# Patient Record
Sex: MALE | Race: BLACK OR AFRICAN AMERICAN | NOT HISPANIC OR LATINO | Employment: OTHER | URBAN - METROPOLITAN AREA
[De-identification: names, ages, dates, MRNs, and addresses within clinical notes are randomized per-mention and may not be internally consistent; named-entity substitution may affect disease eponyms.]

---

## 2017-01-04 ENCOUNTER — CLINICAL SUPPORT (OUTPATIENT)
Dept: REHABILITATION | Facility: HOSPITAL | Age: 42
End: 2017-01-04
Attending: INTERNAL MEDICINE
Payer: MEDICARE

## 2017-01-04 DIAGNOSIS — M51.9 LUMBAR DISC DISEASE: ICD-10-CM

## 2017-01-04 PROCEDURE — 97010 HOT OR COLD PACKS THERAPY: CPT | Mod: PN

## 2017-01-04 PROCEDURE — 97110 THERAPEUTIC EXERCISES: CPT | Mod: PN

## 2017-01-04 NOTE — PROGRESS NOTES
"Name: Francisco J CAMPUZANO [facility] Number: [id number]  Date of Treatment: 01/04/2017   Diagnosis:   Encounter Diagnosis   Name Primary?    Lumbar disc disease        Time in: 1203  Time Out: 1210  Total Treatment Time: 67        Subjective:    "I got a little shock in me.  I think it's arthritis.  It goes to aggravating me."    Francisco J reports feeling stiff today.  Patient reports their pain to be 7/10 on a 0-10 scale with 0 being no pain and 10 being the worst pain imaginable.    Objective    Patient received individual therapy to increase strength, endurance, ROM, flexibility, posture and core stabilization with 0 patients with activities as follows:     Francisco J received therapeutic exercises to develop strength, endurance, ROM, flexibility, posture and core stabilization for 57 minutes including:     Hamstring stretch 3 x 30 sec  Piriformis stretch 3 x 30 sec B LE  nustep x 9 min L-3  Seated forward flexion with large SB x 0 reps  gastroc stretch 3 x 30 sec  LTR x 30 reps with SB  DKTC x 30 reps with SB  Bridging x 30 reps     MHP x 10 min to low back/cervical area in supine to decrease pain and relax muscles      Pt demo good understanding of the education provided. Francisco J demonstrated good return demonstration of activities.     Assessment:     Pt c/o "shocking" sensation in L shin when on nustep.    Pt will continue to benefit from skilled PT intervention. Medical Necessity is demonstrated by:  Fall Risk, Unable to participate in daily activities, Pain limits function of effected part for some activities, Unable to participate fully in daily activities, Requires skilled supervision to complete and progress HEP and Weakness.    Patient is making good progress towards established goals.        Plan:  Continue with established Plan of Care towards PT goals.   "

## 2017-01-06 ENCOUNTER — CLINICAL SUPPORT (OUTPATIENT)
Dept: REHABILITATION | Facility: HOSPITAL | Age: 42
End: 2017-01-06
Attending: INTERNAL MEDICINE
Payer: MEDICARE

## 2017-01-06 DIAGNOSIS — M51.9 LUMBAR DISC DISEASE: ICD-10-CM

## 2017-01-06 PROCEDURE — 97110 THERAPEUTIC EXERCISES: CPT | Mod: PN

## 2017-01-06 NOTE — PROGRESS NOTES
Name: Francisco J CAMPUZANO [facility] Number: [id number]  Date of Treatment: 01/06/2017   Diagnosis:   Encounter Diagnosis   Name Primary?    Lumbar disc disease        Time in: 1105  Time Out: 1159  Total Treatment Time: 54  Group Time: 0      Subjective:    Francisco J reports increased pain today. (Weather cold w rain.).  Patient reports their pain to be 7/10 on a 0-10 scale with 0 being no pain and 10 being the worst pain imaginable.    Objective    Patient received individual therapy to increase ROM, flexibility, posture and core stabilization with 0 patients with activities as follows:     Francisco J received therapeutic exercises to develop ROM, flexibility, posture and core stabilization for 54 minutes including:     Seated fwd flexion w red physioball x 30  Supine:  Bridges w BTB x 30  DKTC x 30  LTR x 30   TKE x 20 R/L w passive HS stretch x 2'  Seated upper trap stretch 3 x 30 sec R/L  cervical rotation 3 x 30 sec R/L  Bicep curls w BTB x 30  The patient received MHP to cervical, thoracic and lumbar spine in supine x 10 min.    The patient received MHP to cervical, thoracic and lumbar spine in supine x 10 min.    Written Home Exercises Provided: cont  Pt demo good understanding of the education provided. Francisco J demonstrated good return demonstration of activities.     Assessment:     Pt reported increased paresthesia R UE w biceps ex and neck rotation L. He also c/o pain in hands w holding reistamnce band stating he has arthritis which limits his ability to hold items and open jars/doorknobs  Pt will continue to benefit from skilled PT intervention. Medical Necessity is demonstrated by:  Pain limits function of effected part for some activities, Requires skilled supervision to complete and progress HEP and Weakness.    Patient is making fair progress towards established goals.    Plan:  Continue with established Plan of Care towards PT goals.

## 2017-01-11 ENCOUNTER — CLINICAL SUPPORT (OUTPATIENT)
Dept: REHABILITATION | Facility: HOSPITAL | Age: 42
End: 2017-01-11
Attending: INTERNAL MEDICINE
Payer: MEDICARE

## 2017-01-11 DIAGNOSIS — M51.9 LUMBAR DISC DISEASE: ICD-10-CM

## 2017-01-11 PROCEDURE — 97032 APPL MODALITY 1+ESTIM EA 15: CPT | Mod: PN

## 2017-01-11 PROCEDURE — 97110 THERAPEUTIC EXERCISES: CPT | Mod: PN

## 2017-01-11 NOTE — PROGRESS NOTES
Name: Francisco J CAMPUZANO [facility] Number: [id number]  Date of Treatment: 2017   Diagnosis:   Encounter Diagnosis   Name Primary?    Lumbar disc disease        Time in: 1100  Time Out: 1159  Total Treatment Time: 59  Group Time: 0      Subjective:    Francisco J amb w spc. He reports having significant back pain on Tuesday where he could hardly get out of bed.  Patient reports their pain to be 5/10 on a 0-10 scale with 0 being no pain and 10 being the worst pain imaginable.    Objective    Patient received individual therapy to increase flexibility, posture and core stabilization with 0 patients with activities as follows:     Francisco J received therapeutic exercises to develop flexibility, posture and core stabilization for 44 minutes includin point:  Angry cat increased pain w ant tilt decreasing back pain  Stretch in child' pose x 4' w some relief  IT band stretch supine 2' R/L  Supine:  DKTC w green physioball 3 x 10  LTR 3 x 10  LTR w green physioball x 30  PPT 3 x 10  Bridges x 30    The patient received the following direct contact modalities after being cleared for contraindication: TENS to  R glut/lumbar region. Pt received continuous mode at a rate of 70 Hz, 30mA for 15 minutes along w moist heat. Francisco J tolerated treatment well without any adverse effects.     Written Home Exercises Provided: add IT band stretch  Pt demo good understanding of the education provided. Francisco J demonstrated fair return demonstration of activities.     Assessment:     Pt responded well to use of TENS along w MHP with decrease in pain to 3/10 w improved gait when leaving treatment area.  Pt will continue to benefit from skilled PT intervention. Medical Necessity is demonstrated by:  Pain limits function of effected part for some activities, Requires skilled supervision to complete and progress HEP and Weakness.    Patient is making fair progress towards established goals.    Plan:  Continue with established Plan of Care towards PT  goals.

## 2017-01-23 ENCOUNTER — CLINICAL SUPPORT (OUTPATIENT)
Dept: REHABILITATION | Facility: HOSPITAL | Age: 42
End: 2017-01-23
Attending: INTERNAL MEDICINE
Payer: MEDICARE

## 2017-01-23 DIAGNOSIS — M51.9 LUMBAR DISC DISEASE: ICD-10-CM

## 2017-01-23 PROCEDURE — 97110 THERAPEUTIC EXERCISES: CPT | Mod: PN

## 2017-01-23 PROCEDURE — G8979 MOBILITY GOAL STATUS: HCPCS | Mod: CJ,PN

## 2017-01-23 PROCEDURE — G8978 MOBILITY CURRENT STATUS: HCPCS | Mod: CL,PN

## 2017-01-23 NOTE — PLAN OF CARE
TIME RECORD    Date: 01/23/2017    Start Time:  1115  Stop Time:  1200    PROCEDURES:    TIMED  Procedure Time Min.   There ex Start:1116  Stop:1200 44    Start:  Stop:     Start:  Stop:     Start:  Stop:          UNTIMED  Procedure Time Min.   reassessment Start:1110  Stop:1115 5    Start:  Stop:      Total Timed Minutes:  45  Total Timed Units:  3  Total Untimed Units:  1  Charges Billed/# of units:  3    PHYSICAL THERAPY UPDATED PLAN OF TREATMENT    Patient name: Francisco J Scott  Onset Date:  Over the past 6 months  SOC Date:  12/16/16  Primary Diagnosis:  Lumbar disc disease  Treatment Diagnosis:  Low back pain  Certification Period:  12/16/16 to 2/3/17  Precautions:  Decreased wt shift/balance  Visits from SOC:  7  Functional Level Prior to SOC: independent w driving and self care but not able to work  Treatment:  Gait training x 10' w RW vc's to use 2 pt vs 3 pt gait and wt shift equally vs wt shift L w poor wt shift R  Pt states he has RW available for use vs spc  Reviewed proper ht adjustment  Trunk flexibility supine, sitting, standing x 10'  Upper trap stretch R 3 x 30 sec  MHP application cervical region and trunk w pt supine x 12'  Updated Assessment:       Cervical AROM: Previous val  Pain/Dysfunction with Movement:   Flexion                                       43  43   Extension                                  40 Elicits pain R arm   Right side bending                    25  36   Left side bending                      35   30   Right rotation                            52   40   Left rotation                              45  36           Thoracic/Lumbar  previous measurements     Flexion 43 cm               fingertip to floor   30   Extension 25*   25   Right side bending             41 cm   43   Left side bending                53 cm   43      Revised Oswestry Low Back Pain Questionnaire: 34/50 indicating 68% impairment   G code current CL  G code goal CJ       Previous Short Term Goals Status:     Unable 1. Pt able to lift 8# wt from floor w proper mechanics 5/5 times without report of increased pain   Unable 2. Pt amb on TM 6' speed less than 1.8 without report increased pain  Not met 3. Pt with improved forward trunk flexion by 10 cm in standing  New Short Term Goals Status:     1. Pt able to lift 5# wt from floor w proper mechanics 5/5 times without report of increased pain   2. Pt amb on TM 3' speed less than 1.8 without report increased pain  3. Pt with improved lateral sidebending equal R/L  in standing  Long Term Goal Status (4 weeks):     1. Pt I with HEP  2. Pt with less than 40% impairment on Revised OLBPQ  3. Pt w improved HS length by 5* B  4. Pt with report of sleeping through 1/2 night without waking from back/neck pain     Reasons for Recertification of Therapy:   Pt has measurable decrease in trunk flexibility and increased R neck/UE and back pain. Recommended he make appt w referring MD then schedule w additional PT per approval of POC.    Certification Period: 1/23/17 to 3/13/17  Recommended Treatment Plan: 2 times per week for 7 weeks: Electrical Stimulation IFC, TENS prn, Gait Training, Group Therapy, Manual Therapy, Moist Heat/ Ice, Neuromuscular Re-ed, Patient Education, Therapeutic Activites, Therapeutic Exercise and Ultrasound/Phonophoresis    Therapist's Name: Trudi Whatley, PT   Date: 01/23/2017    I CERTIFY THE NEED FOR THESE SERVICES FURNISHED UNDER THIS PLAN OF TREATMENT AND WHILE UNDER MY CARE    Physician's comments: ________________________________________________________________________________________________________________________________________________      Physician's Name: ___________________________________

## 2017-04-21 ENCOUNTER — DOCUMENTATION ONLY (OUTPATIENT)
Dept: REHABILITATION | Facility: HOSPITAL | Age: 42
End: 2017-04-21

## 2017-04-21 NOTE — PROGRESS NOTES
REHAB SERVICES OUTPATIENT DISCHARGE SUMMARY  Physical Therapy      Name:  Francisco J Scott  Date:  4/21/17  Date of Evaluation:  12/16/16  Physician:  ROHIT Boogie MD  Total # Of Visits:  7    Diagnosis:  Lumbar disc disease    Physical/Functional Status:  See EMR for last POC update  The patient is to be discharged from our Therapy service for the following reason(s):  Patient has not attended therapy since 1/23/17    Degree of Goal Achievement:  Patient has not met goals secondary to:  Did not return for intervention/reassessment    Patient Education:  provided    Discharge Plan:  Other:  F/u w referring MD

## 2021-01-19 ENCOUNTER — HOSPITAL ENCOUNTER (EMERGENCY)
Facility: HOSPITAL | Age: 46
Discharge: LEFT BEFORE DISCHARGE | End: 2021-01-19
Attending: EMERGENCY MEDICINE
Payer: COMMERCIAL

## 2021-01-19 VITALS
RESPIRATION RATE: 17 BRPM | HEART RATE: 77 BPM | SYSTOLIC BLOOD PRESSURE: 113 MMHG | OXYGEN SATURATION: 97 % | BODY MASS INDEX: 21.22 KG/M2 | DIASTOLIC BLOOD PRESSURE: 75 MMHG | WEIGHT: 140 LBS | HEIGHT: 68 IN | TEMPERATURE: 99 F

## 2021-01-19 DIAGNOSIS — R19.7 DIARRHEA: ICD-10-CM

## 2021-01-19 DIAGNOSIS — R10.32 LEFT LOWER QUADRANT ABDOMINAL PAIN: Primary | ICD-10-CM

## 2021-01-19 LAB
ALBUMIN SERPL BCP-MCNC: 4 G/DL (ref 3.5–5.2)
ALP SERPL-CCNC: 75 U/L (ref 55–135)
ALT SERPL W/O P-5'-P-CCNC: 22 U/L (ref 10–44)
ANION GAP SERPL CALC-SCNC: 9 MMOL/L (ref 8–16)
AST SERPL-CCNC: 31 U/L (ref 10–40)
BASOPHILS # BLD AUTO: 0.01 K/UL (ref 0–0.2)
BASOPHILS NFR BLD: 0.2 % (ref 0–1.9)
BILIRUB SERPL-MCNC: 1.2 MG/DL (ref 0.1–1)
BUN SERPL-MCNC: 9 MG/DL (ref 6–20)
CALCIUM SERPL-MCNC: 8.5 MG/DL (ref 8.7–10.5)
CHLORIDE SERPL-SCNC: 105 MMOL/L (ref 95–110)
CO2 SERPL-SCNC: 22 MMOL/L (ref 23–29)
CREAT SERPL-MCNC: 0.7 MG/DL (ref 0.5–1.4)
DIFFERENTIAL METHOD: ABNORMAL
EOSINOPHIL # BLD AUTO: 0.1 K/UL (ref 0–0.5)
EOSINOPHIL NFR BLD: 2 % (ref 0–8)
ERYTHROCYTE [DISTWIDTH] IN BLOOD BY AUTOMATED COUNT: 15 % (ref 11.5–14.5)
EST. GFR  (AFRICAN AMERICAN): >60 ML/MIN/1.73 M^2
EST. GFR  (NON AFRICAN AMERICAN): >60 ML/MIN/1.73 M^2
GLUCOSE SERPL-MCNC: 96 MG/DL (ref 70–110)
HCT VFR BLD AUTO: 40.9 % (ref 40–54)
HGB BLD-MCNC: 13.4 G/DL (ref 14–18)
IMM GRANULOCYTES # BLD AUTO: 0.02 K/UL (ref 0–0.04)
IMM GRANULOCYTES NFR BLD AUTO: 0.4 % (ref 0–0.5)
LIPASE SERPL-CCNC: 21 U/L (ref 4–60)
LYMPHOCYTES # BLD AUTO: 2 K/UL (ref 1–4.8)
LYMPHOCYTES NFR BLD: 44.3 % (ref 18–48)
MCH RBC QN AUTO: 30.8 PG (ref 27–31)
MCHC RBC AUTO-ENTMCNC: 32.8 G/DL (ref 32–36)
MCV RBC AUTO: 94 FL (ref 82–98)
MONOCYTES # BLD AUTO: 0.6 K/UL (ref 0.3–1)
MONOCYTES NFR BLD: 13.4 % (ref 4–15)
NEUTROPHILS # BLD AUTO: 1.8 K/UL (ref 1.8–7.7)
NEUTROPHILS NFR BLD: 39.7 % (ref 38–73)
NRBC BLD-RTO: 0 /100 WBC
PLATELET # BLD AUTO: 334 K/UL (ref 150–350)
PMV BLD AUTO: 8.7 FL (ref 9.2–12.9)
POTASSIUM SERPL-SCNC: 3.6 MMOL/L (ref 3.5–5.1)
PROT SERPL-MCNC: 7.8 G/DL (ref 6–8.4)
RBC # BLD AUTO: 4.35 M/UL (ref 4.6–6.2)
SODIUM SERPL-SCNC: 136 MMOL/L (ref 136–145)
WBC # BLD AUTO: 4.61 K/UL (ref 3.9–12.7)

## 2021-01-19 PROCEDURE — 83690 ASSAY OF LIPASE: CPT

## 2021-01-19 PROCEDURE — 85025 COMPLETE CBC W/AUTO DIFF WBC: CPT

## 2021-01-19 PROCEDURE — 99284 EMERGENCY DEPT VISIT MOD MDM: CPT | Mod: 25

## 2021-01-19 PROCEDURE — 80053 COMPREHEN METABOLIC PANEL: CPT

## 2021-01-19 PROCEDURE — 36415 COLL VENOUS BLD VENIPUNCTURE: CPT

## 2021-07-01 ENCOUNTER — OFFICE VISIT (OUTPATIENT)
Dept: URGENT CARE | Facility: CLINIC | Age: 46
End: 2021-07-01
Payer: COMMERCIAL

## 2021-07-01 VITALS
WEIGHT: 141 LBS | SYSTOLIC BLOOD PRESSURE: 104 MMHG | TEMPERATURE: 98 F | HEIGHT: 67 IN | RESPIRATION RATE: 16 BRPM | DIASTOLIC BLOOD PRESSURE: 71 MMHG | OXYGEN SATURATION: 97 % | BODY MASS INDEX: 22.13 KG/M2 | HEART RATE: 78 BPM

## 2021-07-01 DIAGNOSIS — L84 FOOT CALLUS: Primary | ICD-10-CM

## 2021-07-01 DIAGNOSIS — M79.672 FOOT PAIN, BILATERAL: ICD-10-CM

## 2021-07-01 DIAGNOSIS — M79.671 FOOT PAIN, BILATERAL: ICD-10-CM

## 2021-07-01 PROCEDURE — 99204 PR OFFICE/OUTPT VISIT, NEW, LEVL IV, 45-59 MIN: ICD-10-PCS | Mod: S$GLB,,, | Performed by: NURSE PRACTITIONER

## 2021-07-01 PROCEDURE — 99204 OFFICE O/P NEW MOD 45 MIN: CPT | Mod: S$GLB,,, | Performed by: NURSE PRACTITIONER

## 2021-07-02 ENCOUNTER — TELEPHONE (OUTPATIENT)
Dept: FAMILY MEDICINE | Facility: CLINIC | Age: 46
End: 2021-07-02

## 2022-02-25 DIAGNOSIS — M79.671 RIGHT FOOT PAIN: Primary | ICD-10-CM

## 2024-05-07 ENCOUNTER — HOSPITAL ENCOUNTER (EMERGENCY)
Facility: HOSPITAL | Age: 49
Discharge: HOME | End: 2024-05-07
Attending: EMERGENCY MEDICINE
Payer: COMMERCIAL

## 2024-05-07 VITALS
DIASTOLIC BLOOD PRESSURE: 60 MMHG | HEIGHT: 67 IN | HEART RATE: 80 BPM | BODY MASS INDEX: 21.97 KG/M2 | RESPIRATION RATE: 17 BRPM | OXYGEN SATURATION: 98 % | SYSTOLIC BLOOD PRESSURE: 120 MMHG | TEMPERATURE: 98 F | WEIGHT: 140 LBS

## 2024-05-07 DIAGNOSIS — H20.9 TRAUMATIC IRITIS: Primary | ICD-10-CM

## 2024-05-07 PROCEDURE — 25000003 PHARM REV CODE 250: Performed by: NURSE PRACTITIONER

## 2024-05-07 PROCEDURE — 99283 EMERGENCY DEPT VISIT LOW MDM: CPT

## 2024-05-07 PROCEDURE — 99283 EMERGENCY DEPT VISIT LOW MDM: CPT | Mod: ,,, | Performed by: OPHTHALMOLOGY

## 2024-05-07 RX ORDER — PROPARACAINE HYDROCHLORIDE 5 MG/ML
1 SOLUTION/ DROPS OPHTHALMIC
Status: DISCONTINUED | OUTPATIENT
Start: 2024-05-07 | End: 2024-05-07 | Stop reason: HOSPADM

## 2024-05-07 RX ORDER — TETRACAINE HYDROCHLORIDE 5 MG/ML
2 SOLUTION OPHTHALMIC
Status: COMPLETED | OUTPATIENT
Start: 2024-05-07 | End: 2024-05-07

## 2024-05-07 RX ORDER — BUTALBITAL, ACETAMINOPHEN AND CAFFEINE 50; 325; 40 MG/1; MG/1; MG/1
1 TABLET ORAL
Status: COMPLETED | OUTPATIENT
Start: 2024-05-07 | End: 2024-05-07

## 2024-05-07 RX ORDER — PREDNISOLONE ACETATE 10 MG/ML
1 SUSPENSION/ DROPS OPHTHALMIC 4 TIMES DAILY
Qty: 15 ML | Refills: 1 | Status: SHIPPED | OUTPATIENT
Start: 2024-05-07 | End: 2024-05-17

## 2024-05-07 RX ADMIN — FLUORESCEIN SODIUM 1 EACH: 1 STRIP OPHTHALMIC at 01:05

## 2024-05-07 RX ADMIN — BUTALBITAL, ACETAMINOPHEN, AND CAFFEINE 1 TABLET: 50; 325; 40 TABLET ORAL at 03:05

## 2024-05-07 RX ADMIN — TETRACAINE HYDROCHLORIDE 2 DROP: 5 SOLUTION OPHTHALMIC at 01:05

## 2024-05-07 NOTE — CONSULTS
Ophthalmology note:    50 yo male struck with a wooden stick in the right eye when weedeating yesterday.  Was not wearing eye protection.  Eye is painful, red, blurry, and sensitive to light.     Exam, right eye:  VA: 20/100 near  IOP: 13 per ED  Pupils: small, sluggish  Full eye movements  2 + conj injection  Cornea: mostly clear with mild edema, no abrasion  AC: 1-2+flare, 1+ cell (difficult to appreciate with portable slit lamp)  Lens: clear    A/P:  Traumatic iritis - right eye  Atropine x 1 given in Ed for cycloplegia  Discussed condition with patient  Protective eyewear discussed    Start  Prednisolone QID OD    F/u Thursday or Friday in eye clinic, sooner if worsening    Please message or call with questions

## 2024-05-07 NOTE — FIRST PROVIDER EVALUATION
Emergency Department TeleTriage Encounter Note      CHIEF COMPLAINT    Chief Complaint   Patient presents with    Eye Injury     Was weed eating.  Eyes are burning and decreased vision       VITAL SIGNS   Initial Vitals [05/07/24 1231]   BP Pulse Resp Temp SpO2   (!) 144/79 69 20 98.3 °F (36.8 °C) 99 %      MAP       --            ALLERGIES    Review of patient's allergies indicates:  No Known Allergies    PROVIDER TRIAGE NOTE  Verbal consent for the teletriage evaluation was given by the patient at the start of the evaluation.  All efforts will be made to maintain patient's privacy during the evaluation.      This is a teletriage evaluation of a 49 y.o. male presenting to the ED with c/o eyes burning and FB sensation that started last night after cutting grass. Limited physical exam via telehealth: The patient is awake, alert, answering questions appropriately and is not in respiratory distress.  As the Teletriage provider, I performed an initial assessment and ordered appropriate labs and imaging studies, if any, to facilitate the patient's care once placed in the ED. Once a room is available, care and a full evaluation will be completed by an alternate ED provider.  Any additional orders and the final disposition will be determined by that provider.  All imaging and labs will not be followed-up by the Teletriage Team, including myself.          ORDERS  Labs Reviewed - No data to display    ED Orders (720h ago, onward)      Start Ordered     Status Ordering Provider    05/07/24 1245 05/07/24 1236  fluorescein ophthalmic strip 1 each  ED 1 Time         Ordered SHELBY HAYDEN    05/07/24 1245 05/07/24 1236  TETRAcaine HCl (PF) 0.5 % Drop 2 drop  ED 1 Time         Ordered SHELBY HAYDEN    05/07/24 1236 05/07/24 1236  Visual acuity screening  Once         Ordered SHELBY HAYDEN    05/07/24 1236 05/07/24 1236  Bring Wood's Lamp to Bedside  Once         Ordered SHELBY HAYDEN              Virtual Visit Note: The provider  triage portion of this emergency department evaluation and documentation was performed via [employer], a HIPAA-compliant telemedicine application, in concert with a tele-presenter in the room. A face to face patient evaluation with one of my colleagues will occur once the patient is placed in an emergency department room.      DISCLAIMER: This note was prepared with [employer] voice recognition transcription software. Garbled syntax, mangled pronouns, and other bizarre constructions may be attributed to that software system.

## 2024-05-07 NOTE — ED TRIAGE NOTES
Pt is reporting burning and redness in both eyes since doing yard work yesterday. Denies known foreign body. Pt is refusing to allow examination of eyes

## 2024-05-07 NOTE — DISCHARGE INSTRUCTIONS
The ophthalmologist would like to see you in the office on Thursday. Call the office and they will schedule it. Let them know he evaluated you in the emergency department here and wants to see you this week.

## 2024-05-08 NOTE — ED PROVIDER NOTES
Encounter Date: 5/7/2024       History     Chief Complaint   Patient presents with    Eye Injury     Was weed eating.  Eyes are burning and decreased vision     Patient is a 49 y.o. male who presents to the ED 05/07/2024 with a chief complaint of right eye pain. Patient states he was weed eating yesterday and a stick flew into his right eye.  He states it was only mild pain initially that resolved in his last night his eyes started to feel like he had something in it and he washed it out.  He states this morning it was worse.  He states he has a foreign body sensation and severe photophobia.  He states his vision is blurred and in his hard for him to keep his eye open.  He states he is supposed to wear glasses but they broke a long time ago.  He states he does not wear contact lenses.  He states his only past medical history is arthritis.             Review of patient's allergies indicates:  No Known Allergies  Past Medical History:   Diagnosis Date    Arthritis     Chronic back pain     Loss of weight      No past surgical history on file.  Family History   Problem Relation Name Age of Onset    Alcohol abuse Neg Hx       Social History     Tobacco Use    Smoking status: Every Day     Current packs/day: 2.00     Average packs/day: 2.0 packs/day for 25.0 years (50.0 ttl pk-yrs)     Types: Cigarettes    Tobacco comments:     trying to but cant. bad nerves   Substance Use Topics    Alcohol use: Yes     Alcohol/week: 1.0 standard drink of alcohol     Types: 1 Cans of beer per week    Drug use: No     Review of Systems   Constitutional:  Negative for chills and fever.   HENT:  Negative for sore throat.    Eyes:  Positive for photophobia, pain, redness and visual disturbance. Negative for discharge and itching.   Respiratory:  Negative for chest tightness and shortness of breath.    Cardiovascular:  Negative for chest pain.   Gastrointestinal:  Negative for abdominal pain.   Genitourinary:  Negative for dysuria.    Musculoskeletal:  Negative for arthralgias and myalgias.   Skin:  Negative for rash and wound.   Neurological:  Negative for syncope.   Hematological:  Does not bruise/bleed easily.       Physical Exam     Initial Vitals [05/07/24 1231]   BP Pulse Resp Temp SpO2   (!) 144/79 69 20 98.3 °F (36.8 °C) 99 %      MAP       --         Physical Exam    Nursing note and vitals reviewed.  Constitutional: He appears well-developed and well-nourished.   HENT:   Head: Normocephalic and atraumatic.   Eyes: EOM and lids are normal. Pupils are equal, round, and reactive to light. Right eye exhibits no chemosis, no discharge, no exudate and no hordeolum. No foreign body present in the right eye. Left eye exhibits no chemosis, no discharge, no exudate and no hordeolum. No foreign body present in the left eye. Right conjunctiva is injected. Right conjunctiva has no hemorrhage. Left conjunctiva is not injected. Left conjunctiva has no hemorrhage. No scleral icterus.   Slit lamp exam:       The right eye shows no corneal abrasion, no corneal flare, no corneal ulcer, no foreign body, no hyphema, no hypopyon, no fluorescein uptake and no anterior chamber bulge.   Right eye injected.  Not limbic sparing but no discrete ciliary flush.  No fluorescein uptake.  Negative Luz Marina sign.  No evidence of foreign body.  Lids everted.  No hyphema or hypopyon. Right eye  IOP 12,13, and 12 mmHg on 3 separate readings.    Neck: Neck supple.   Normal range of motion.  Cardiovascular:  Normal rate, regular rhythm, normal heart sounds and intact distal pulses.           Pulmonary/Chest: Breath sounds normal.   Musculoskeletal:         General: Normal range of motion.      Cervical back: Normal range of motion and neck supple.     Neurological: He is alert and oriented to person, place, and time. He has normal strength. No sensory deficit.   Skin: Skin is warm and dry.   Psychiatric: He has a normal mood and affect.         ED Course   Procedures  Labs  Reviewed - No data to display       Imaging Results    None          Medications   fluorescein ophthalmic strip 1 each (1 each Both Eyes Given 5/7/24 1325)   TETRAcaine HCl (PF) 0.5 % Drop 2 drop (2 drops Both Eyes Given 5/7/24 1325)   fluorescein ophthalmic strip 1 each (1 each Both Eyes Given 5/7/24 1325)   butalbital-acetaminophen-caffeine -40 mg per tablet 1 tablet (1 tablet Oral Given 5/7/24 1500)     Medical Decision Making  Risk  Prescription drug management.         APC / Resident Notes:   Patient is a 49 y.o. male who presents to the ED 05/07/2024 who underwent emergent evaluation for painful red eye after injury to the eye yesterday. Decreased visual acuity noted and exam concerning for Traumatic iritis.  No evidence of abrasion or ulceration at this time.  No evidence of retained foreign body.  Spoke with ophthalmologist Dr. Pedraza who presented to the bedside in the emergency department and evaluated patient.  He agrees patient appears to have traumatic iritis and recommends prednisolone drops 4 times a day and follow up this week in the office with Dr. Pedraza.  Patient agreeable to this plan of care.  Vital signs normal.  No systemic signs of infection or other acute process such as globe rupture. Based on my clinical evaluation, I do not appreciate any immediate, emergent, or life threatening condition or etiology that warrants additional workup today and feel that the patient can be discharged with close follow up care. Case discussed with Dr. Dewitt who is agreeable to plan of care. Follow up and return precautions discussed; patient verbalized understanding and is agreeable to plan of care. Patient discharged home in stable condition.                  ED Course as of 05/07/24 2039   Tue May 07, 2024   1639 Spoke with Dr. Pedraza ophthalmologist who will come evaluate pt.   [JK]      ED Course User Index  [JK] Isatu Limon NP               Medical Decision Making:   Differential Diagnosis:    Corneal abrasion  Traumatic iritis  Globe rupture             Clinical Impression:  Final diagnoses:  [H20.9] Traumatic iritis (Primary)          ED Disposition Condition    Discharge Stable          ED Prescriptions       Medication Sig Dispense Start Date End Date Auth. Provider    prednisoLONE acetate (PRED FORTE) 1 % DrpS Place 1 drop into the right eye 4 (four) times daily. for 10 days 15 mL 5/7/2024 5/17/2024 Isatu Limon NP          Follow-up Information       Follow up With Specialties Details Why Contact Info Additional Information    Edilson Pedraza MD Ophthalmology In 3 days  [address]  [facility] Ophthalmology  [facility] [id number]  [phone]       [facility] -  Emergency Medicine  As needed, If symptoms worsen [address] [place] [address] [address] [place] [address] 1st floor             Isatu Limon NP  05/07/24 2039

## 2024-05-09 ENCOUNTER — OFFICE VISIT (OUTPATIENT)
Dept: OPHTHALMOLOGY | Facility: CLINIC | Age: 49
End: 2024-05-09
Payer: MEDICARE

## 2024-05-09 DIAGNOSIS — H20.9 TRAUMATIC IRITIS: Primary | ICD-10-CM

## 2024-05-09 PROCEDURE — 1159F MED LIST DOCD IN RCRD: CPT | Mod: CPTII,S$GLB,, | Performed by: OPHTHALMOLOGY

## 2024-05-09 PROCEDURE — 99999 PR PBB SHADOW E&M-EST. PATIENT-LVL II: CPT | Mod: PBBFAC,,, | Performed by: OPHTHALMOLOGY

## 2024-05-09 PROCEDURE — 99213 OFFICE O/P EST LOW 20 MIN: CPT | Mod: S$GLB,,, | Performed by: OPHTHALMOLOGY

## 2024-05-09 PROCEDURE — 1160F RVW MEDS BY RX/DR IN RCRD: CPT | Mod: CPTII,S$GLB,, | Performed by: OPHTHALMOLOGY

## 2024-05-09 NOTE — PROGRESS NOTES
HPI    48 YO male presents today for a traumatic iritis OD. Patient states that   he is having light sensitivity, and blurred vision OD. Notes he started   drops yesterday 05/08.     Pred OD QID   Last edited by Xi Santos on 5/9/2024  2:09 PM.            Assessment /Plan     For exam results, see Encounter Report.    Traumatic iritis      OD  Improved symptoms  K edema + AC cell    Continue pred QID OD    F/u 1 week, dilate OD

## 2024-05-21 ENCOUNTER — TELEPHONE (OUTPATIENT)
Dept: OPHTHALMOLOGY | Facility: CLINIC | Age: 49
End: 2024-05-21
Payer: MEDICARE